# Patient Record
Sex: MALE | Race: BLACK OR AFRICAN AMERICAN | NOT HISPANIC OR LATINO | URBAN - METROPOLITAN AREA
[De-identification: names, ages, dates, MRNs, and addresses within clinical notes are randomized per-mention and may not be internally consistent; named-entity substitution may affect disease eponyms.]

---

## 2018-08-23 ENCOUNTER — INPATIENT (INPATIENT)
Facility: HOSPITAL | Age: 33
LOS: 1 days | Discharge: HOME | End: 2018-08-25
Attending: HOSPITALIST | Admitting: HOSPITALIST
Payer: COMMERCIAL

## 2018-08-23 VITALS
OXYGEN SATURATION: 99 % | WEIGHT: 255.96 LBS | DIASTOLIC BLOOD PRESSURE: 74 MMHG | SYSTOLIC BLOOD PRESSURE: 147 MMHG | HEIGHT: 75 IN | HEART RATE: 56 BPM | TEMPERATURE: 99 F | RESPIRATION RATE: 17 BRPM

## 2018-08-23 LAB
ALBUMIN SERPL ELPH-MCNC: 3.7 G/DL — SIGNIFICANT CHANGE UP (ref 3.3–5)
ALP SERPL-CCNC: 59 U/L — SIGNIFICANT CHANGE UP (ref 40–120)
ALT FLD-CCNC: 24 U/L — SIGNIFICANT CHANGE UP (ref 12–78)
ANION GAP SERPL CALC-SCNC: 11 MMOL/L — SIGNIFICANT CHANGE UP (ref 5–17)
AST SERPL-CCNC: 19 U/L — SIGNIFICANT CHANGE UP (ref 15–37)
BASOPHILS # BLD AUTO: 0.01 K/UL — SIGNIFICANT CHANGE UP (ref 0–0.2)
BASOPHILS NFR BLD AUTO: 0.2 % — SIGNIFICANT CHANGE UP (ref 0–2)
BILIRUB SERPL-MCNC: 0.5 MG/DL — SIGNIFICANT CHANGE UP (ref 0.2–1.2)
BUN SERPL-MCNC: 9 MG/DL — SIGNIFICANT CHANGE UP (ref 7–23)
CALCIUM SERPL-MCNC: 8.7 MG/DL — SIGNIFICANT CHANGE UP (ref 8.5–10.1)
CHLORIDE SERPL-SCNC: 100 MMOL/L — SIGNIFICANT CHANGE UP (ref 96–108)
CO2 SERPL-SCNC: 28 MMOL/L — SIGNIFICANT CHANGE UP (ref 22–31)
CREAT SERPL-MCNC: 1.09 MG/DL — SIGNIFICANT CHANGE UP (ref 0.5–1.3)
EOSINOPHIL # BLD AUTO: 0 K/UL — SIGNIFICANT CHANGE UP (ref 0–0.5)
EOSINOPHIL NFR BLD AUTO: 0 % — SIGNIFICANT CHANGE UP (ref 0–6)
FLUAV SPEC QL CULT: NEGATIVE — SIGNIFICANT CHANGE UP
FLUBV AG SPEC QL IA: NEGATIVE — SIGNIFICANT CHANGE UP
GLUCOSE SERPL-MCNC: 103 MG/DL — HIGH (ref 70–99)
HCT VFR BLD CALC: 38.2 % — LOW (ref 39–50)
HGB BLD-MCNC: 12.2 G/DL — LOW (ref 13–17)
IMM GRANULOCYTES NFR BLD AUTO: 0.2 % — SIGNIFICANT CHANGE UP (ref 0–1.5)
LYMPHOCYTES # BLD AUTO: 1.37 K/UL — SIGNIFICANT CHANGE UP (ref 1–3.3)
LYMPHOCYTES # BLD AUTO: 22.5 % — SIGNIFICANT CHANGE UP (ref 13–44)
MAGNESIUM SERPL-MCNC: 2 MG/DL — SIGNIFICANT CHANGE UP (ref 1.6–2.6)
MCHC RBC-ENTMCNC: 29.5 PG — SIGNIFICANT CHANGE UP (ref 27–34)
MCHC RBC-ENTMCNC: 31.9 GM/DL — LOW (ref 32–36)
MCV RBC AUTO: 92.3 FL — SIGNIFICANT CHANGE UP (ref 80–100)
MONOCYTES # BLD AUTO: 0.68 K/UL — SIGNIFICANT CHANGE UP (ref 0–0.9)
MONOCYTES NFR BLD AUTO: 11.1 % — SIGNIFICANT CHANGE UP (ref 2–14)
NEUTROPHILS # BLD AUTO: 4.03 K/UL — SIGNIFICANT CHANGE UP (ref 1.8–7.4)
NEUTROPHILS NFR BLD AUTO: 66 % — SIGNIFICANT CHANGE UP (ref 43–77)
PLATELET # BLD AUTO: 226 K/UL — SIGNIFICANT CHANGE UP (ref 150–400)
POTASSIUM SERPL-MCNC: 3.6 MMOL/L — SIGNIFICANT CHANGE UP (ref 3.5–5.3)
POTASSIUM SERPL-SCNC: 3.6 MMOL/L — SIGNIFICANT CHANGE UP (ref 3.5–5.3)
PROT SERPL-MCNC: 7.5 GM/DL — SIGNIFICANT CHANGE UP (ref 6–8.3)
RBC # BLD: 4.14 M/UL — LOW (ref 4.2–5.8)
RBC # FLD: 11.9 % — SIGNIFICANT CHANGE UP (ref 10.3–14.5)
SODIUM SERPL-SCNC: 139 MMOL/L — SIGNIFICANT CHANGE UP (ref 135–145)
WBC # BLD: 6.1 K/UL — SIGNIFICANT CHANGE UP (ref 3.8–10.5)
WBC # FLD AUTO: 6.1 K/UL — SIGNIFICANT CHANGE UP (ref 3.8–10.5)

## 2018-08-23 PROCEDURE — 70450 CT HEAD/BRAIN W/O DYE: CPT | Mod: 26

## 2018-08-23 PROCEDURE — 99285 EMERGENCY DEPT VISIT HI MDM: CPT

## 2018-08-23 PROCEDURE — 99222 1ST HOSP IP/OBS MODERATE 55: CPT

## 2018-08-23 RX ORDER — DIPHENHYDRAMINE HCL 50 MG
50 CAPSULE ORAL ONCE
Qty: 0 | Refills: 0 | Status: COMPLETED | OUTPATIENT
Start: 2018-08-23 | End: 2018-08-23

## 2018-08-23 RX ORDER — METOCLOPRAMIDE HCL 10 MG
1 TABLET ORAL
Qty: 20 | Refills: 0 | OUTPATIENT
Start: 2018-08-23

## 2018-08-23 RX ORDER — METOCLOPRAMIDE HCL 10 MG
10 TABLET ORAL ONCE
Qty: 0 | Refills: 0 | Status: COMPLETED | OUTPATIENT
Start: 2018-08-23 | End: 2018-08-23

## 2018-08-23 RX ORDER — ACETAMINOPHEN 500 MG
650 TABLET ORAL ONCE
Qty: 0 | Refills: 0 | Status: COMPLETED | OUTPATIENT
Start: 2018-08-23 | End: 2018-08-23

## 2018-08-23 RX ORDER — OXYCODONE AND ACETAMINOPHEN 5; 325 MG/1; MG/1
1 TABLET ORAL ONCE
Qty: 0 | Refills: 0 | Status: DISCONTINUED | OUTPATIENT
Start: 2018-08-23 | End: 2018-08-23

## 2018-08-23 RX ORDER — ACETAMINOPHEN 500 MG
975 TABLET ORAL ONCE
Qty: 0 | Refills: 0 | Status: COMPLETED | OUTPATIENT
Start: 2018-08-23 | End: 2018-08-23

## 2018-08-23 RX ORDER — SODIUM CHLORIDE 9 MG/ML
1000 INJECTION INTRAMUSCULAR; INTRAVENOUS; SUBCUTANEOUS ONCE
Qty: 0 | Refills: 0 | Status: COMPLETED | OUTPATIENT
Start: 2018-08-23 | End: 2018-08-23

## 2018-08-23 RX ORDER — MAGNESIUM SULFATE 500 MG/ML
1 VIAL (ML) INJECTION ONCE
Qty: 0 | Refills: 0 | Status: COMPLETED | OUTPATIENT
Start: 2018-08-23 | End: 2018-08-23

## 2018-08-23 RX ORDER — DIPHENHYDRAMINE HCL 50 MG
1 CAPSULE ORAL
Qty: 20 | Refills: 0 | OUTPATIENT
Start: 2018-08-23

## 2018-08-23 RX ORDER — KETOROLAC TROMETHAMINE 30 MG/ML
30 SYRINGE (ML) INJECTION ONCE
Qty: 0 | Refills: 0 | Status: DISCONTINUED | OUTPATIENT
Start: 2018-08-23 | End: 2018-08-23

## 2018-08-23 RX ADMIN — Medication 650 MILLIGRAM(S): at 15:54

## 2018-08-23 RX ADMIN — SODIUM CHLORIDE 1000 MILLILITER(S): 9 INJECTION INTRAMUSCULAR; INTRAVENOUS; SUBCUTANEOUS at 23:08

## 2018-08-23 RX ADMIN — Medication 30 MILLIGRAM(S): at 17:12

## 2018-08-23 RX ADMIN — Medication 1 GRAM(S): at 18:12

## 2018-08-23 RX ADMIN — OXYCODONE AND ACETAMINOPHEN 1 TABLET(S): 5; 325 TABLET ORAL at 23:08

## 2018-08-23 RX ADMIN — Medication 10 MILLIGRAM(S): at 21:15

## 2018-08-23 RX ADMIN — Medication 100 GRAM(S): at 17:12

## 2018-08-23 RX ADMIN — Medication 975 MILLIGRAM(S): at 19:55

## 2018-08-23 RX ADMIN — Medication 50 MILLIGRAM(S): at 21:15

## 2018-08-23 RX ADMIN — SODIUM CHLORIDE 2000 MILLILITER(S): 9 INJECTION INTRAMUSCULAR; INTRAVENOUS; SUBCUTANEOUS at 15:54

## 2018-08-23 RX ADMIN — SODIUM CHLORIDE 1000 MILLILITER(S): 9 INJECTION INTRAMUSCULAR; INTRAVENOUS; SUBCUTANEOUS at 16:58

## 2018-08-23 RX ADMIN — Medication 30 MILLIGRAM(S): at 17:23

## 2018-08-23 RX ADMIN — OXYCODONE AND ACETAMINOPHEN 1 TABLET(S): 5; 325 TABLET ORAL at 23:09

## 2018-08-23 RX ADMIN — Medication 10 MILLIGRAM(S): at 15:54

## 2018-08-23 RX ADMIN — Medication 50 MILLIGRAM(S): at 15:54

## 2018-08-23 RX ADMIN — SODIUM CHLORIDE 2000 MILLILITER(S): 9 INJECTION INTRAMUSCULAR; INTRAVENOUS; SUBCUTANEOUS at 21:15

## 2018-08-23 RX ADMIN — Medication 650 MILLIGRAM(S): at 16:02

## 2018-08-23 NOTE — ED PROVIDER NOTE - MEDICAL DECISION MAKING DETAILS
pt here with HA, n/photosentivity, pt is A and O x 3, GCS 15, no trauma, neuro exam unremarkable, likely migraine, will give ivf, reglan, benadryl, (pt not driving home) tylenol, CT head

## 2018-08-23 NOTE — ED ADULT NURSE REASSESSMENT NOTE - NS ED NURSE REASSESS COMMENT FT1
Assumed care of patient at 1915hours: patient discharge on Hold due to increased temperature and unrelieved headache. Medicated as ordered.

## 2018-08-23 NOTE — ED PROVIDER NOTE - CARE PLAN
Principal Discharge DX:	Acute nonintractable headache, unspecified headache type Principal Discharge DX:	Acute intractable headache, unspecified headache type  Secondary Diagnosis:	Fever, unspecified fever cause

## 2018-08-23 NOTE — ED PROVIDER NOTE - ATTENDING CONTRIBUTION TO CARE
I have seen the patient with the PA and agree with above examination and assessment and plan with the following addendum:        Focused PE:   General: NAD, alert and oriented  Head: Normocephalic, atraumatic  Eyes: PERRLA, EOMI  Cardiac: RRR, no murmurs, rubs or gallops  Resp: CTA, no wheezes, rales or ronchi  GI: Nondistended, nontender, no rebound or guarding  Neuro: Alert and oriented, no focal deficits. Normal gait. No neck stiffness, no AMS. Light sensitivity.  Ext: Non edematous, nontender.

## 2018-08-23 NOTE — ED PROVIDER NOTE - PRINCIPAL DIAGNOSIS
Acute nonintractable headache, unspecified headache type Acute intractable headache, unspecified headache type

## 2018-08-23 NOTE — ED ADULT NURSE REASSESSMENT NOTE - NS ED NURSE REASSESS COMMENT FT1
patient sleeping at this time , easy to arouse , patient stated the head it's still hurting , md aware

## 2018-08-23 NOTE — ED PROVIDER NOTE - PHYSICAL EXAMINATION
Gen: Alert, NAD, not toxic looking  Head: NC, AT, PERRL, EOMI, normal lids/conjunctiva  ENT: B TM WNL, normal hearing, patent oropharynx without erythema/exudate, uvula midline  Neck: +supple, no tenderness/meningismus/JVD, +Trachea midline  Pulm: Bilateral BS, normal resp effort, no wheeze/stridor/retractions  CV: RRR, no M/R/G, +dist pulses  Abd: soft, NT/ND, +BS, no hepatosplenomegaly  Mskel: no edema/erythema/cyanosis, str 5/5 x4 b/l, no spinal tenderness, sensations intact, no saddle anesthesia, gait ok  Skin: no rash  Neuro: AAOx3, no sensory/motor deficits, CN 2-12 intact, ftn, hts intact, neg pronator drift

## 2018-08-23 NOTE — ED ADULT NURSE NOTE - NSIMPLEMENTINTERV_GEN_ALL_ED
Implemented All Fall Risk Interventions:  [place] to call system. Call bell, personal items and telephone within reach. Instruct patient to call for assistance. Room bathroom lighting operational. Non-slip footwear when patient is off stretcher. Physically safe environment: no spills, clutter or unnecessary equipment. Stretcher in lowest position, wheels locked, appropriate side rails in place. Provide visual cue, wrist band, yellow gown, etc. Monitor gait and stability. Monitor for mental status changes and reorient to person, place, and time. Review medications for side effects contributing to fall risk. Reinforce activity limits and safety measures with patient and family.

## 2018-08-23 NOTE — ED ADULT TRIAGE NOTE - CHIEF COMPLAINT QUOTE
Progressive headache, nausea, vomiting with weakness since Monday. States this is the worse headache in his life.

## 2018-08-23 NOTE — ED PROVIDER NOTE - PROGRESS NOTE DETAILS
labs ok, ct head neg for acute pathology. upon reexam pt states feeling little better, found to have fever 100.5, d/w pt and wife at beside at length re LP, low risk however can't be ruled out Pt and wife decline LP, given return precautions. Feeling better now, watching tv without discomfort. Ok for d/c. pt requesting more meds for HA, offered admission for further w/u and neuro eval, declined, will repeat med cocktail which helped before and reeval pt still with HA, wife states not comf taking pt home like this, still refusing LP, will admit for intractable MAZARIEGOS, Dr Richmond macedo pt still with HA, wife states not comf taking pt home like this, still refusing LP, d/w Dr. Hurley, states to admit for intractable MAZARIEGOS, Dr. Kay aware

## 2018-08-23 NOTE — ED PROVIDER NOTE - OBJECTIVE STATEMENT
31 y/o male with no pMH here c/o headache associated with nausea and photosensitivity x 4 days. pt states headache was gradual onset, pressure like, associated with chills, and body aches. he went to urgent care last night and was told has virus and was given tamiful and ibuprofen, but pt states headache was still not going away so he came to ed. pt reports one episode of vomiting this am. no fevers. no sick contacts. no recent travel. no weakness no neck pain, change in vision, change in hearing, change in sensation. denies cp, sob, cough, dizziness, abd pain, rash    ROS: No fever +chills. No eye pain/changes in vision, No ear pain/sore throat/dysphagia, No chest pain/palpitations. No SOB/cough/. No abdominal pain, +N/V no D, no black/bloody bm. No dysuria/frequency/discharge, +headache. No Dizziness.    No rashes or breaks in skin. No numbness/tingling/weakness.

## 2018-08-24 DIAGNOSIS — R51 HEADACHE: ICD-10-CM

## 2018-08-24 DIAGNOSIS — Z29.9 ENCOUNTER FOR PROPHYLACTIC MEASURES, UNSPECIFIED: ICD-10-CM

## 2018-08-24 DIAGNOSIS — R50.9 FEVER, UNSPECIFIED: ICD-10-CM

## 2018-08-24 PROCEDURE — 99233 SBSQ HOSP IP/OBS HIGH 50: CPT

## 2018-08-24 RX ORDER — OXYCODONE AND ACETAMINOPHEN 5; 325 MG/1; MG/1
2 TABLET ORAL EVERY 6 HOURS
Qty: 0 | Refills: 0 | Status: DISCONTINUED | OUTPATIENT
Start: 2018-08-24 | End: 2018-08-25

## 2018-08-24 RX ADMIN — Medication 375 MILLIGRAM(S): at 19:00

## 2018-08-24 RX ADMIN — OXYCODONE AND ACETAMINOPHEN 2 TABLET(S): 5; 325 TABLET ORAL at 23:23

## 2018-08-24 RX ADMIN — OXYCODONE AND ACETAMINOPHEN 2 TABLET(S): 5; 325 TABLET ORAL at 14:50

## 2018-08-24 RX ADMIN — Medication 75 MILLIGRAM(S): at 06:44

## 2018-08-24 RX ADMIN — Medication 75 MILLIGRAM(S): at 18:04

## 2018-08-24 RX ADMIN — Medication 375 MILLIGRAM(S): at 02:57

## 2018-08-24 RX ADMIN — Medication 375 MILLIGRAM(S): at 18:04

## 2018-08-24 RX ADMIN — OXYCODONE AND ACETAMINOPHEN 2 TABLET(S): 5; 325 TABLET ORAL at 13:50

## 2018-08-24 RX ADMIN — Medication 375 MILLIGRAM(S): at 03:57

## 2018-08-24 NOTE — PROGRESS NOTE ADULT - ASSESSMENT
PT is a 33 y/o male with no pmhx was in [facility] till 4 days ago started to develop body aches, nausea, HA and low grade fever, states was bothersome to look at light.  Pt denies any sick contacts or travels.  [facility] started on tamiflu and ibuprofen.  pt denies any sob, cp, palpitations, n/v/d/c  pt in ed had temp, and multiple meds for ha w/o help and was recomended LP which pt declined.  and neuro recomended admission.  Pt currently states ha improving.        still with headache but feels better will continue observation

## 2018-08-24 NOTE — CONSULT NOTE ADULT - ASSESSMENT
Subjective Complaints:  Historian:    Patient.  ER note reviewed.  Brain CT: unremarkable.   HPI:  VALERIE COE.  PT is a 33 y/o male with no pmhx was in [facility] till 4 days ago started to develop body aches, nausea, HA and low grade fever, states was bothersome to look at light.  Pt denies any sick contacts or travels.  Pt states went to urgent care yesterday started on tamiflu and ibuprofen.  pt denies any sob, cp, palpitations, n/v/d/c  pt in ed had temp, and multiple meds for ha w/o help and was recommended LP which pt declined.  Neuro recommended admission.  Pt currently states ha improving. (24 Aug 2018 02:01)      PAST MEDICAL & SURGICAL HISTORY: No pertinent past medical history; No significant past surgical history    MEDICATIONS  (STANDING):  naproxen 375 milliGRAM(s) Oral two times a day  oseltamivir 75 milliGRAM(s) Oral two times a day    MEDICATIONS  (PRN):  oxyCODONE    5 mG/acetaminophen 325 mG 2 Tablet(s) Oral every 6 hours PRN Moderate Pain (4 - 6)    Allergies: No Known Allergies  Intolerances  FAMILY HISTORY:  Family history of diabetes mellitus (Father)    Vital Signs Last 24 Hrs  T(C): 37.4 (24 Aug 2018 11:28), Max: 38.6 (24 Aug 2018 03:23)  T(F): 99.3 (24 Aug 2018 11:28), Max: 101.5 (24 Aug 2018 03:23)  HR: 54 (24 Aug 2018 11:28) (47 - 59)  BP: 142/81 (24 Aug 2018 11:28) (112/55 - 155/78)  BP(mean): --  RR: 18 (24 Aug 2018 11:28) (17 - 27)  SpO2: 99% (24 Aug 2018 11:28) (96% - 99%)    NEUROLOGICAL EXAM:  HENT:  Normocephalic head; atraumatic head.  Neck supple.  ENT: normal looking.  Mental State:    Alert.  Fully oriented to person, place and date.  Coherent.  Speech clear and intact.  Cooperative.  Responds appropriately.    Cranial Nerves:  II-XII:   Pupils round and reactive to light and accommodation.  Extraocular movements full.  Visual fields full (no homonymous hemianopsia).  Visual acuity wnl.  Facial symmetry intact.  Tongue midline.  Motor Functions:  Moves all extremities.  No pronator drift of UE.  Claps hands well.  Hand  intact bilaterally.      Sensory Functions:   Intact to touch and pinprick to face and extremities.    Reflexes:  Deep tendon reflexes normoactive to biceps, knees and ankles.    Cerebellar Testing:    Finger to nose intact.  Nystagmus absent.  Neurovascular: Carotid auscultation full without bruits.      LABS:                        12.2   6.10  )-----------( 226      ( 23 Aug 2018 15:23 )             38.2     08-23    139  |  100  |  9   ----------------------------<  103<H>  3.6   |  28  |  1.09    Ca    8.7      23 Aug 2018 15:23  Mg     2.0     08-23    TPro  7.5  /  Alb  3.7  /  TBili  0.5  /  DBili  x   /  AST  19  /  ALT  24  /  AlkPhos  59  08-23    RADIOLOGY & ADDITIONAL STUDIES:    Complete Blood Count + Automated Diff: STAT (08-23 @ 14:44)  Comprehensive Metabolic Panel: STAT (08-23 @ 14:44)  sodium chloride 0.9% Bolus:   1000 milliLiter(s), IV Bolus, once, infuse over 30 Minute(s), Stop After 1 Doses  Provider's Contact #: [phone] (08-23 @ 14:44)  metoclopramide Injectable: [Ordered as REGLAN Injectable]  10 milliGRAM(s), IV Push, once, Stop After 1 Doses  Administration Instructions: Max IV Push dose 10 milliGRAM(s)  IF IV PUSH, ADMINISTER OVER 2 MIN  Provider's Contact #: [phone] (08-23 @ 14:44)  diphenhydrAMINE   Injectable: [Ordered as BENADRYL Injectable]  50 milliGRAM(s), IV Push, Once, Stop After 1 Doses  Administration Instructions: Max IV Push dose 50 milliGRAM(s)  IF IV PUSH, ADMINISTER 25 mG PER MIN  This is a Look-alike/Sound-alike Medication  Provider's Contact #: [phone] (08-23 @ 14:44)  acetaminophen   Tablet.: [Ordered as TYLENOL.]  650 milliGRAM(s), Oral, once, Stop After 1 Doses  Administration Instructions: MAX DAILY DOSE:  ADULT = 4,000 mG/Day  Provider's Contact #: [phone] (08-23 @ 14:44)  CT Head No Cont: Urgent   Indication: headache  Transport: Stretcher-Crib  Exam Completed  Provider's Contact #: [phone] (08-23 @ 14:44)  Magnesium, Serum: STAT (08-23 @ 14:44)  Nucleated RBC: 15:10 (08-23 @ 15:24)  (Floorstock):   Qty Removed: 1 each (08-23 @ 15:52)  (Floorstock):   Qty Removed: 1 each (08-23 @ 15:53)  ketorolac   Injectable: [Ordered as TORADOL Injectable]  30 milliGRAM(s), IV Push, Once, Stop After 1 Doses  Administration Instructions: IF IV PUSH, ADMINISTER OVER 1 MINUTE  Dispose unused medication in BLACK bin.  Provider's Contact #: [phone] (08-23 @ 16:56)  magnesium sulfate  IVPB:   1 Gram(s) in dextrose 5% 100 milliLiter(s), IV Intermittent, Once, infuse over 60 Minute(s), Stop After 1 Doses  Provider's Contact #: [phone] (08-23 @ 16:56)  (Floorock):   Qty Removed: 1 each (08-23 @ 17:09)  acetaminophen   Tablet: [Ordered as TYLENOL]  975 milliGRAM(s), Oral, once, Stop After 1 Doses  Administration Instructions: MAX DAILY DOSE:  ADULT = 4,000 mG/Day  Provider's Contact #: [phone] (08-23 @ 18:39)  sodium chloride 0.9% Bolus:   1000 milliLiter(s), IV Bolus, once, infuse over 30 Minute(s), Stop After 1 Doses  Provider's Contact #: [phone] (08-23 @ 20:40)  diphenhydrAMINE   Injectable: [Ordered as BENADRYL Injectable]  50 milliGRAM(s), IV Push, Once, Stop After 1 Doses  Administration Instructions: Max IV Push dose 50 milliGRAM(s)  IF IV PUSH, ADMINISTER 25 mG PER MIN  This is a Look-alike/Sound-alike Medication  Provider's Contact #: [phone] (08-23 @ 20:40)  metoclopramide Injectable: [Ordered as REGLAN Injectable]  10 milliGRAM(s), IV Push, once, Stop After 1 Doses  Administration Instructions: Max IV Push dose 10 milliGRAM(s)  IF IV PUSH, ADMINISTER OVER 2 MIN  Provider's Contact #: [phone] (08-23 @ 20:40)  (Floorstock):   Qty Removed: 1 each (08-23 @ 21:11)  (Floorstock):   Qty Removed: 1 each (08-23 @ 21:11)  Influenza Virus A and B Rapid Antigen: STAT  Specimen Source: Nasopharyngeal Swab (08-23 @ 22:16)  oxyCODONE    5 mG/acetaminophen 325 mG: [Ordered as PERCOCET]  1 Tablet(s), Oral, once  Administration Instructions: ACETAMINOPHEN MAX DAILY DOSE:  ADULT = 4,000 mG/Day  This is a Look-alike/Sound-alike Medication  Provider's Contact #: [phone] (08-23 @ 22:25)  Admit to Inpatient Level of Care:     Service:  MEDICAL/SURGICAL    Physician:  Nicanor Kay    Additional Instructions:  Diagnosis: Acute intractable headache, unspecified headache type; Fever, unspecified fever cause  Isolation: None  Special Consideration: None (08-23 @ 22:31)  Admit from ED (08-23 @ 22:48)  (ADM OVERRIDE):   Qty Removed: 1 each  Route - Dose Given <see task> (08-23 @ 23:06)  Rapid Respiratory Viral Panel: 23:28 (08-23 @ 23:54)  Provider to RN:       pt [facility] (08-24 @ 01:30)  Culture - Blood: Routine  Specimen Source: Blood-Peripheral (08-24 @ 02:11)  Culture - Blood: Routine  Specimen Source: Blood-Peripheral (08-24 @ 02:11)  VTE Prophylaxis - Low Risk No Prophylaxis Required:     Time/Priority:  Routine (08-24 @ 02:11)  naproxen: [Ordered as NAPROSYN]  375 milliGRAM(s), Oral, two times a day  Special Instructions: w/food  Provider's Contact #: [phone] (08-24 @ 02:28)  oxyCODONE    5 mG/acetaminophen 325 mG: [Ordered as PERCOCET]  2 Tablet(s), Oral, every 6 hours, PRN for Moderate Pain (4 - 6)  Administration Instructions: ACETAMINOPHEN MAX DAILY DOSE:  ADULT = 4,000 mG/Day  This is a Look-alike/Sound-alike Medication  Provider's Contact #: [phone] (08-24 @ 02:28)  Diet, Regular (08-24 @ 02:28)  oseltamivir: [Ordered as TAMIFLU]  75 milliGRAM(s), Oral, two times a day, Stop After 4 Days  Indication: TREATMENT  Provider's Contact #: [phone] (08-24 @ 02:28)  Activity - Ambulate as Tolerated:     Time/Priority:  Routine (08-24 @ 03:19)    Assessment & Opinion:  Headaches of moderate severity, probably vasomotor.  Presently with normal neurologic examination findings;  Recommendations:   Medications:  Continue symptomatic medications.  Neurologically stable.

## 2018-08-24 NOTE — H&P ADULT - PROBLEM SELECTOR PLAN 1
admit to medicine  likely viral sx's,  will cont tamiflu  nsaids  no clinical signs of meningitis  neuro eval

## 2018-08-24 NOTE — H&P ADULT - NSHPPHYSICALEXAM_GEN_ALL_CORE
Vital Signs Last 24 Hrs  T(C): 38.1 (23 Aug 2018 21:38), Max: 38.3 (23 Aug 2018 18:37)  T(F): 100.6 (23 Aug 2018 21:38), Max: 100.9 (23 Aug 2018 18:37)  HR: 59 (23 Aug 2018 21:38) (55 - 59)  BP: 132/78 (23 Aug 2018 21:38) (112/55 - 147/74)  BP(mean): --  RR: 17 (23 Aug 2018 21:38) (17 - 27)  SpO2: 99% (23 Aug 2018 18:37) (97% - 99%)    PHYSICAL EXAM:    GENERAL: NAD, well-groomed, well-developed  HEAD:  Atraumatic, Normocephalic  EYES: EOMI, PERRLA, conjunctiva and sclera clear  ENMT: No tonsillar erythema, exudates, or enlargement; Moist mucous membranes, No lesions  NECK: Supple, No JVD, Normal thyroid, no nuchal rigidity  NERVOUS SYSTEM:  Alert & Oriented X3, Good concentration; Motor Strength 5/5 B/L upper and lower extremities; DTRs 2+ intact and symmetric  CHEST/LUNG: Clear to percussion bilaterally; No rales, rhonchi, wheezing, or rubs  HEART: Regular rate and rhythm; No rubs, or gallops, +S1,S2  ABDOMEN: Soft, Nontender, Nondistended; Bowel sounds present  EXTREMITIES:  2+ Peripheral Pulses, No clubbing, cyanosis, or edema  LYMPH: No cervical adenopathy  RECTAL: deferred  BREAST: No palpatble masses, skin no lesions   : deferred  SKIN: No rashes or lesions    IMPROVE VTE Individual Risk Assessment          RISK                                                          Points  [  ] Previous VTE                                                3  [  ] Thrombophilia                                             2  [  ] Lower limb paralysis                                    2        (unable to hold up >15 seconds)    [  ] Current Cancer                                             2         (within 6 months)  [  ] Immobilization > 24 hrs                              1  [  ] ICU/CCU stay > 24 hours                            1  [  ] Age > 60                                                    1  IMPROVE VTE Score ____0_____

## 2018-08-24 NOTE — PROGRESS NOTE ADULT - SUBJECTIVE AND OBJECTIVE BOX
Patient is a 32y old  Male who presents with a chief complaint of HA (24 Aug 2018 02:01)      INTERVAL HPI/OVERNIGHT EVENTS: admission headaches     MEDICATIONS  (STANDING):  naproxen 375 milliGRAM(s) Oral two times a day  oseltamivir 75 milliGRAM(s) Oral two times a day    MEDICATIONS  (PRN):  oxyCODONE    5 mG/acetaminophen 325 mG 2 Tablet(s) Oral every 6 hours PRN Moderate Pain (4 - 6)      Allergies    No Known Allergies    Intolerances        REVIEW OF SYSTEMS:  CONSTITUTIONAL:fever fatigue  EYES: No eye pain, visual disturbances, or discharge  ENMT:  No difficulty hearing, tinnitus, vertigo; No sinus or throat pain  NECK: No pain or stiffness  RESPIRATORY: No cough, wheezing, chills or hemoptysis; No shortness of breath  CARDIOVASCULAR: No chest pain, palpitations, dizziness, or leg swelling  GASTROINTESTINAL: No abdominal or epigastric pain. No nausea, vomiting, or hematemesis; No diarrhea or constipation. No melena or hematochezia.  GENITOURINARY: No dysuria, frequency, hematuria, or incontinence  NEUROLOGICAL: headaches,  no memory loss, loss of strength, numbness, or tremors  SKIN: No itching, burning, rashes, or lesions   LYMPH NODES: No enlarged glands  ENDOCRINE: No heat or cold intolerance; No hair loss  MUSCULOSKELETAL: No joint pain or swelling; No muscle, back, or extremity pain  PSYCHIATRIC: No depression, anxiety, mood swings, or difficulty sleeping  HEME/LYMPH: No easy bruising, or bleeding gums  ALLERGY AND IMMUNOLOGIC: No hives or eczema    Vital Signs Last 24 Hrs  T(C): 37.5 (24 Aug 2018 17:05), Max: 38.6 (24 Aug 2018 03:23)  T(F): 99.5 (24 Aug 2018 17:05), Max: 101.5 (24 Aug 2018 03:23)  HR: 55 (24 Aug 2018 17:05) (47 - 59)  BP: 115/65 (24 Aug 2018 17:05) (114/52 - 155/78)  BP(mean): --  RR: 18 (24 Aug 2018 17:05) (17 - 20)  SpO2: 99% (24 Aug 2018 17:05) (96% - 99%)    PHYSICAL EXAM:  GENERAL: NAD, well-groomed, well-developed  HEAD:  Atraumatic, Normocephalic  EYES: EOMI, PERRLA, conjunctiva and sclera clear  ENMT: No tonsillar erythema, exudates, or enlargement; Moist mucous membranes, Good dentition, No lesions  NECK: Supple, No JVD, Normal thyroid  NERVOUS SYSTEM:  Alert & Oriented X3, Good concentration; Motor Strength 5/5 B/L upper and lower extremities; DTRs 2+ intact and symmetric  CHEST/LUNG: Clear to percussion bilaterally; No rales, rhonchi, wheezing, or rubs  HEART: Regular rate and rhythm; No murmurs, rubs, or gallops  ABDOMEN: Soft, Nontender, Nondistended; Bowel sounds present  EXTREMITIES:  2+ Peripheral Pulses, No clubbing, cyanosis, or edema  LYMPH: No lymphadenopathy noted  SKIN: No rashes or lesions    LABS:                        12.2   6.10  )-----------( 226      ( 23 Aug 2018 15:23 )             38.2     08-23    139  |  100  |  9   ----------------------------<  103<H>  3.6   |  28  |  1.09    Ca    8.7      23 Aug 2018 15:23  Mg     2.0     08-23    TPro  7.5  /  Alb  3.7  /  TBili  0.5  /  DBili  x   /  AST  19  /  ALT  24  /  AlkPhos  59  08-23        CAPILLARY BLOOD GLUCOSE          RADIOLOGY & ADDITIONAL TESTS:  < from: CT Head No Cont (08.23.18 @ 15:29) >  IMPRESSION:    Unremarkable noncontrast CT of the brain.    < end of copied text >    Imaging Personally Reviewed:  [X ] YES  [ ] NO    Consultant(s) Notes Reviewed:  [X ] YES  [ ] NO    Care Discussed with Consultants/Other Providers [X ] YES  [ ] NO

## 2018-08-24 NOTE — H&P ADULT - HISTORY OF PRESENT ILLNESS
PT is a 33 y/o male with no pmhx was in [facility] till 4 days ago started to develop body aches, nausea, HA and low grade fever, states was bothersome to look at light.  Pt denies any sick contacts or travels.  [facility] started on tamiflu and ibuprofen.  pt denies any sob, cp, palpitations, n/v/d/c  pt in ed had temp, and multiple meds for ha w/o help and was recomended LP which pt declined.  and neuro recomended admission.  Pt currently states ha improving.

## 2018-08-24 NOTE — H&P ADULT - NSHPLABSRESULTS_GEN_ALL_CORE
LABS:                        12.2   6.10  )-----------( 226      ( 23 Aug 2018 15:23 )             38.2     08-23    139  |  100  |  9   ----------------------------<  103<H>  3.6   |  28  |  1.09    Ca    8.7      23 Aug 2018 15:23  Mg     2.0     08-23    TPro  7.5  /  Alb  3.7  /  TBili  0.5  /  DBili  x   /  AST  19  /  ALT  24  /  AlkPhos  59  08-23        CAPILLARY BLOOD GLUCOSE          RADIOLOGY & ADDITIONAL TESTS:    Imaging Personally Reviewed:  [ X] YES  [ ] NO

## 2018-08-25 ENCOUNTER — TRANSCRIPTION ENCOUNTER (OUTPATIENT)
Age: 33
End: 2018-08-25

## 2018-08-25 VITALS
RESPIRATION RATE: 17 BRPM | SYSTOLIC BLOOD PRESSURE: 132 MMHG | OXYGEN SATURATION: 100 % | TEMPERATURE: 100 F | HEART RATE: 55 BPM | DIASTOLIC BLOOD PRESSURE: 77 MMHG

## 2018-08-25 PROCEDURE — 99239 HOSP IP/OBS DSCHRG MGMT >30: CPT

## 2018-08-25 RX ORDER — METOCLOPRAMIDE HCL 10 MG
1 TABLET ORAL
Qty: 10 | Refills: 0 | OUTPATIENT
Start: 2018-08-25 | End: 2018-08-29

## 2018-08-25 RX ADMIN — OXYCODONE AND ACETAMINOPHEN 2 TABLET(S): 5; 325 TABLET ORAL at 00:23

## 2018-08-25 NOTE — DISCHARGE NOTE ADULT - HOSPITAL COURSE
History of Present Illness:  Reason for Admission: HA	  History of Present Illness: 	  PT is a 33 y/o male with no pmhx was in [facility] till 4 days ago started to develop body aches, nausea, HA and low grade fever, states was bothersome to look at light.  Pt denies any sick contacts or travels.  pt states went to [facility] care yesterday started on tamiflu and ibuprofen.  pt denies any sob, cp, palpitations, n/v/d/c  pt in ed had temp, and multiple meds for ha w/o help and was recomended LP which pt declined.  and neuro recomended admission.  Pt currently states ha improving.    Progress Note:   · Provider Specialty	Neurology	      · Subjective and Objective: 	  INTERVAL HPI/OVERNIGHT EVENTS:  Uneventful. Offers no complaints.  Neuro examination fully normal with intact mental state; no neck rigidity.  Ambulatory; in no acute distress;  RECENT RADIOLOGY & ADDITIONAL TESTS:  Blood c/s no growth.    NEUROLOGICAL EXAM (Pertinent):  Vital Signs Last 24 Hrs  T(C): 36.8 (25 Aug 2018 05:13), Max: 37.9 (24 Aug 2018 14:15)  T(F): 98.3 (25 Aug 2018 05:13), Max: 100.3 (24 Aug 2018 14:15)  HR: 53 (25 Aug 2018 05:13) (52 - 55)  BP: 123/65 (25 Aug 2018 05:13) (115/65 - 134/72)  BP(mean): --  RR: 17 (25 Aug 2018 05:13) (17 - 18)  SpO2: 100% (25 Aug 2018 05:13) (97% - 100%)    MEDICATIONS  (STANDING):  naproxen 375 milliGRAM(s) Oral two times a day  oseltamivir 75 milliGRAM(s) Oral two times a day    MEDICATIONS  (PRN):  oxyCODONE    5 mG/acetaminophen 325 mG 2 Tablet(s) Oral every 6 hours PRN Moderate Pain (4 - 6)    LABS:                        12.2   6.10  )-----------( 226      ( 23 Aug 2018 15:23 )             38.2     08-23    139  |  100  |  9   ----------------------------<  103<H>  3.6   |  28  |  1.09    Ca    8.7      23 Aug 2018 15:23  Mg     2.0     08-23    TPro  7.5  /  Alb  3.7  /  TBili  0.5  /  DBili  x   /  AST  19  /  ALT  24  /  AlkPhos  59  08-23    Assessment & Opinion:  Headaches of moderate severity, probably vasomotor.  Presently with normal neurologic examination findings;  Recommendations:   Medications:  Continue symptomatic medications.  Neurologically stable for discharge.  Office follow up. History of Present Illness:  Reason for Admission: HA	  History of Present Illness: 	  PT is a 31 y/o male with no pmhx was in [facility] till 4 days ago started to develop body aches, nausea, HA and low grade fever, states was bothersome to look at light.  Pt denies any sick contacts or travels.  pt states went to [facility] care yesterday started on tamiflu and ibuprofen.  pt denies any sob, cp, palpitations, n/v/d/c  pt in ed had temp, and multiple meds for ha w/o help and was recomended LP which pt declined.  and neuro recomended admission.  Pt currently states ha improving.    Progress Note:   · Provider Specialty	Neurology	      · Subjective and Objective: 	  INTERVAL HPI/OVERNIGHT EVENTS:  Uneventful. Offers no complaints.  Neuro examination fully normal with intact mental state; no neck rigidity.  Ambulatory; in no acute distress;  RECENT RADIOLOGY & ADDITIONAL TESTS:  Blood c/s no growth.    NEUROLOGICAL EXAM (Pertinent):  Vital Signs Last 24 Hrs  T(C): 36.8 (25 Aug 2018 05:13), Max: 37.9 (24 Aug 2018 14:15)  T(F): 98.3 (25 Aug 2018 05:13), Max: 100.3 (24 Aug 2018 14:15)  HR: 53 (25 Aug 2018 05:13) (52 - 55)  BP: 123/65 (25 Aug 2018 05:13) (115/65 - 134/72)  BP(mean): --  RR: 17 (25 Aug 2018 05:13) (17 - 18)  SpO2: 100% (25 Aug 2018 05:13) (97% - 100%)    MEDICATIONS  (STANDING):  naproxen 375 milliGRAM(s) Oral two times a day  oseltamivir 75 milliGRAM(s) Oral two times a day    MEDICATIONS  (PRN):  oxyCODONE    5 mG/acetaminophen 325 mG 2 Tablet(s) Oral every 6 hours PRN Moderate Pain (4 - 6)    LABS:                        12.2   6.10  )-----------( 226      ( 23 Aug 2018 15:23 )             38.2     08-23    139  |  100  |  9   ----------------------------<  103<H>  3.6   |  28  |  1.09    Ca    8.7      23 Aug 2018 15:23  Mg     2.0     08-23    TPro  7.5  /  Alb  3.7  /  TBili  0.5  /  DBili  x   /  AST  19  /  ALT  24  /  AlkPhos  59  08-23    Assessment & Opinion:  Headaches of moderate severity, probably vasomotor.  Presently with normal neurologic examination findings;  Recommendations:   Medications:  Continue symptomatic medications.  Neurologically stable for discharge.  Office follow up.    [place] acute viral infection

## 2018-08-25 NOTE — DISCHARGE NOTE ADULT - MEDICATION SUMMARY - MEDICATIONS TO TAKE
I will START or STAY ON the medications listed below when I get home from the hospital:    naproxen 375 mg oral tablet  -- 1 tab(s) by mouth 2 times a day  -- Indication: For Acute intractable headache, unspecified headache type    metoclopramide 5 mg oral tablet  -- 1 tab(s) by mouth 2 times a day   -- May cause drowsiness.  Alcohol may intensify this effect.  Use care when operating dangerous machinery.  Take medication on an empty stomach 1 hour before or 2 to 3 hours after a meal unless otherwise directed by your doctor.    -- Indication: For ACUTE INTRACTABLE HEADACHE,UNSPECIFIED HEADACHE TYPE,  FEVER I will START or STAY ON the medications listed below when I get home from the hospital:    naproxen 375 mg oral tablet  -- 1 tab(s) by mouth 2 times a day  -- Indication: For Acute intractable headache, unspecified headache type    oxyCODONE-acetaminophen 5 mg-325 mg oral tablet  -- 2 tab(s) by mouth every 6 hours, As needed, Moderate Pain (4 - 6) MDD:8 tabs  -- Indication: For Acute intractable headache, unspecified headache type    metoclopramide 5 mg oral tablet  -- 1 tab(s) by mouth 2 times a day   -- May cause drowsiness.  Alcohol may intensify this effect.  Use care when operating dangerous machinery.  Take medication on an empty stomach 1 hour before or 2 to 3 hours after a meal unless otherwise directed by your doctor.    -- Indication: For ACUTE INTRACTABLE HEADACHE,UNSPECIFIED HEADACHE TYPE,  FEVER

## 2018-08-25 NOTE — DISCHARGE NOTE ADULT - CARE PLAN
Principal Discharge DX:	Acute intractable headache, unspecified headache type  Goal:	may continue naproxen for headaches and fever  Assessment and plan of treatment:	please follow up with a primary doctor  Secondary Diagnosis:	Viral illness  Goal:	you most likely had   a virus  Assessment and plan of treatment:	please follow up with your primary doctor get rest and stay hydrated   you may use Tylenol or Motrin for fever

## 2018-08-25 NOTE — DISCHARGE NOTE ADULT - PATIENT PORTAL LINK FT
You can access the [facility], offered by [facility], by registering with the following website: [email or web address]

## 2018-08-25 NOTE — DISCHARGE NOTE ADULT - PLAN OF CARE
may continue naproxen for headaches and fever please follow up with a primary doctor you most likely had   a virus please follow up with your primary doctor get rest and stay hydrated   you may use Tylenol or Motrin for fever

## 2018-08-25 NOTE — PROGRESS NOTE ADULT - SUBJECTIVE AND OBJECTIVE BOX
INTERVAL HPI/OVERNIGHT EVENTS:  Uneventful. Offers no complaints.  Neuro examination fully normal with intact mental state; no neck rigidity.  Ambulatory; in no acute distress;  RECENT RADIOLOGY & ADDITIONAL TESTS:  Blood c/s no growth.    NEUROLOGICAL EXAM (Pertinent):  Vital Signs Last 24 Hrs  T(C): 36.8 (25 Aug 2018 05:13), Max: 37.9 (24 Aug 2018 14:15)  T(F): 98.3 (25 Aug 2018 05:13), Max: 100.3 (24 Aug 2018 14:15)  HR: 53 (25 Aug 2018 05:13) (52 - 55)  BP: 123/65 (25 Aug 2018 05:13) (115/65 - 134/72)  BP(mean): --  RR: 17 (25 Aug 2018 05:13) (17 - 18)  SpO2: 100% (25 Aug 2018 05:13) (97% - 100%)    MEDICATIONS  (STANDING):  naproxen 375 milliGRAM(s) Oral two times a day  oseltamivir 75 milliGRAM(s) Oral two times a day    MEDICATIONS  (PRN):  oxyCODONE    5 mG/acetaminophen 325 mG 2 Tablet(s) Oral every 6 hours PRN Moderate Pain (4 - 6)    LABS:                        12.2   6.10  )-----------( 226      ( 23 Aug 2018 15:23 )             38.2     08-23    139  |  100  |  9   ----------------------------<  103<H>  3.6   |  28  |  1.09    Ca    8.7      23 Aug 2018 15:23  Mg     2.0     08-23    TPro  7.5  /  Alb  3.7  /  TBili  0.5  /  DBili  x   /  AST  19  /  ALT  24  /  AlkPhos  59  08-23    Assessment & Opinion:  Headaches of moderate severity, probably vasomotor.  Presently with normal neurologic examination findings;  Recommendations:   Medications:  Continue symptomatic medications.  Neurologically stable for discharge.  Office follow up.

## 2018-08-29 DIAGNOSIS — B34.9 VIRAL INFECTION, UNSPECIFIED: ICD-10-CM

## 2018-08-29 DIAGNOSIS — F17.210 NICOTINE DEPENDENCE, CIGARETTES, UNCOMPLICATED: ICD-10-CM

## 2018-08-29 DIAGNOSIS — R51 HEADACHE: ICD-10-CM

## 2018-08-29 DIAGNOSIS — F12.99 CANNABIS USE, UNSPECIFIED WITH UNSPECIFIED CANNABIS-INDUCED DISORDER: ICD-10-CM

## 2018-08-29 LAB
CULTURE RESULTS: SIGNIFICANT CHANGE UP
CULTURE RESULTS: SIGNIFICANT CHANGE UP
SPECIMEN SOURCE: SIGNIFICANT CHANGE UP
SPECIMEN SOURCE: SIGNIFICANT CHANGE UP

## 2020-12-24 ENCOUNTER — EMERGENCY (EMERGENCY)
Facility: HOSPITAL | Age: 35
LOS: 1 days | Discharge: HOME | End: 2020-12-24
Attending: EMERGENCY MEDICINE | Admitting: EMERGENCY MEDICINE
Payer: COMMERCIAL

## 2020-12-24 VITALS
HEART RATE: 68 BPM | HEIGHT: 76 IN | TEMPERATURE: 99 F | DIASTOLIC BLOOD PRESSURE: 72 MMHG | SYSTOLIC BLOOD PRESSURE: 124 MMHG | OXYGEN SATURATION: 100 % | RESPIRATION RATE: 15 BRPM | WEIGHT: 225.09 LBS

## 2020-12-24 VITALS
SYSTOLIC BLOOD PRESSURE: 128 MMHG | RESPIRATION RATE: 14 BRPM | TEMPERATURE: 99 F | DIASTOLIC BLOOD PRESSURE: 56 MMHG | OXYGEN SATURATION: 100 % | HEART RATE: 61 BPM

## 2020-12-24 PROCEDURE — 99283 EMERGENCY DEPT VISIT LOW MDM: CPT

## 2020-12-24 PROCEDURE — 99284 EMERGENCY DEPT VISIT MOD MDM: CPT

## 2020-12-24 RX ORDER — IBUPROFEN 200 MG
1 TABLET ORAL
Qty: 30 | Refills: 0
Start: 2020-12-24

## 2020-12-24 RX ORDER — CYCLOBENZAPRINE HYDROCHLORIDE 10 MG/1
1 TABLET, FILM COATED ORAL
Qty: 15 | Refills: 0
Start: 2020-12-24

## 2020-12-24 RX ORDER — IBUPROFEN 200 MG
600 TABLET ORAL ONCE
Refills: 0 | Status: COMPLETED | OUTPATIENT
Start: 2020-12-24 | End: 2020-12-24

## 2020-12-24 RX ADMIN — Medication 600 MILLIGRAM(S): at 11:20

## 2020-12-24 NOTE — ED ADULT NURSE NOTE - NSIMPLEMENTINTERV_GEN_ALL_ED
Implemented All Universal Safety Interventions:  [place] to call system. Call bell, personal items and telephone within reach. Instruct patient to call for assistance. Room bathroom lighting operational. Non-slip footwear when patient is off stretcher. Physically safe environment: no spills, clutter or unnecessary equipment. Stretcher in lowest position, wheels locked, appropriate side rails in place.
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
## 2020-12-24 NOTE — ED PROVIDER NOTE - CARE PROVIDER_API CALL
Cooper Mcwilliams  ORTHOPAEDIC SURGERY  [address] [place] [address]  Phone: [phone]  Fax: [phone]  Follow Up Time: 1-3 Days

## 2020-12-24 NOTE — ED ADULT NURSE NOTE - ED CARDIAC RHYTHM
[Skin Rash] : skin rash [Fever] : no fever [Chills] : no chills [Chest Pain] : no chest pain [Palpitations] : no palpitations [Lower Ext Edema] : no lower extremity edema [Shortness Of Breath] : no shortness of breath [Wheezing] : no wheezing [Cough] : no cough [Dyspnea on Exertion] : no dyspnea on exertion [Abdominal Pain] : no abdominal pain [Nausea] : no nausea [Constipation] : no constipation [Diarrhea] : diarrhea [Vomiting] : no vomiting [Dysuria] : no dysuria [Joint Pain] : no joint pain regular
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
## 2020-12-24 NOTE — ED PROVIDER NOTE - MUSCULOSKELETAL, MLM
Spine appears normal, range of motion is not limited, no muscle or joint tenderness. There is no neck tenderness or deformity. FROM intact.
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
## 2020-12-24 NOTE — ED ADULT NURSE NOTE - CHPI ED NUR SYMPTOMS NEG
no acting out behaviors/no bruising/no crying/no decreased eating/drinking/no difficulty bearing weight/no disorientation/no dizziness/no headache/no laceration/no loss of consciousness/no sleeping issues

## 2020-12-24 NOTE — ED PROVIDER NOTE - PATIENT PORTAL LINK FT
You can access the FollowMyHealth Patient Portal offered by [facility] by registering at the following website: [email or web address]. By joining [employer]’s FollowMyHealth portal, you will also be able to view your health information using other applications (apps) compatible with our system.

## 2020-12-24 NOTE — ED ADULT TRIAGE NOTE - HEIGHT IN FEET
6 (0) Normal symmetrical movements (0) Normal symmetrical movements (0) Normal symmetrical movements

## 2020-12-24 NOTE — ED PROVIDER NOTE - CHPI ED SYMPTOMS NEG
no back pain/no disorientation/no dizziness/no headache/no laceration/no loss of consciousness/no neck tenderness/no bruising/no difficulty bearing weight
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
## 2020-12-24 NOTE — ED PROVIDER NOTE - ENMT, MLM
Airway patent, Nasal mucosa clear. Mouth with normal mucosa. Throat has no vesicles, no oropharyngeal exudates and uvula is midline.  Atraumatic.

## 2023-08-08 NOTE — ED PROVIDER NOTE - TOBACCO USE
Never smoker Same Histology In Subsequent Stages Text: The pattern and morphology of the tumor is as described in the first stage.
